# Patient Record
Sex: MALE | Race: BLACK OR AFRICAN AMERICAN | NOT HISPANIC OR LATINO | Employment: UNEMPLOYED | URBAN - METROPOLITAN AREA
[De-identification: names, ages, dates, MRNs, and addresses within clinical notes are randomized per-mention and may not be internally consistent; named-entity substitution may affect disease eponyms.]

---
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
## 2025-01-06 ENCOUNTER — OFFICE VISIT (OUTPATIENT)
Dept: PEDIATRICS | Facility: CLINIC | Age: 20
End: 2025-01-06
Payer: COMMERCIAL

## 2025-01-06 VITALS
DIASTOLIC BLOOD PRESSURE: 76 MMHG | BODY MASS INDEX: 32.03 KG/M2 | OXYGEN SATURATION: 99 % | WEIGHT: 228.8 LBS | HEIGHT: 71 IN | TEMPERATURE: 98.4 F | HEART RATE: 78 BPM | SYSTOLIC BLOOD PRESSURE: 115 MMHG

## 2025-01-06 DIAGNOSIS — Z11.59 NEED FOR HEPATITIS C SCREENING TEST: ICD-10-CM

## 2025-01-06 DIAGNOSIS — Z11.4 SCREENING FOR HIV (HUMAN IMMUNODEFICIENCY VIRUS): Primary | ICD-10-CM

## 2025-01-06 DIAGNOSIS — Z00.00 ROUTINE HISTORY AND PHYSICAL EXAMINATION OF ADULT: ICD-10-CM

## 2025-01-06 DIAGNOSIS — J45.20 INTERMITTENT ASTHMA WITHOUT COMPLICATION, UNSPECIFIED ASTHMA SEVERITY: ICD-10-CM

## 2025-01-06 PROCEDURE — 99173 VISUAL ACUITY SCREEN: CPT | Mod: 59 | Performed by: PEDIATRICS

## 2025-01-06 PROCEDURE — 90480 ADMN SARSCOV2 VAC 1/ONLY CMP: CPT | Performed by: PEDIATRICS

## 2025-01-06 PROCEDURE — 90471 IMMUNIZATION ADMIN: CPT | Performed by: PEDIATRICS

## 2025-01-06 PROCEDURE — 91320 SARSCV2 VAC 30MCG TRS-SUC IM: CPT | Performed by: PEDIATRICS

## 2025-01-06 PROCEDURE — 96127 BRIEF EMOTIONAL/BEHAV ASSMT: CPT | Mod: 4MD | Performed by: PEDIATRICS

## 2025-01-06 PROCEDURE — 99395 PREV VISIT EST AGE 18-39: CPT | Mod: 25 | Performed by: PEDIATRICS

## 2025-01-06 PROCEDURE — 92551 PURE TONE HEARING TEST AIR: CPT | Mod: 4MD | Performed by: PEDIATRICS

## 2025-01-06 PROCEDURE — 99213 OFFICE O/P EST LOW 20 MIN: CPT | Mod: 25 | Performed by: PEDIATRICS

## 2025-01-06 PROCEDURE — S0302 COMPLETED EPSDT: HCPCS | Mod: 4MD | Performed by: PEDIATRICS

## 2025-01-06 PROCEDURE — 90656 IIV3 VACC NO PRSV 0.5 ML IM: CPT | Performed by: PEDIATRICS

## 2025-01-06 RX ORDER — BUDESONIDE AND FORMOTEROL FUMARATE DIHYDRATE 80; 4.5 UG/1; UG/1
2 AEROSOL RESPIRATORY (INHALATION) 2 TIMES DAILY
Qty: 10.2 G | Refills: 0 | Status: SHIPPED | OUTPATIENT
Start: 2025-01-06

## 2025-01-06 SDOH — HEALTH STABILITY: PHYSICAL HEALTH: ON AVERAGE, HOW MANY DAYS PER WEEK DO YOU ENGAGE IN MODERATE TO STRENUOUS EXERCISE (LIKE A BRISK WALK)?: 3 DAYS

## 2025-01-06 SDOH — HEALTH STABILITY: PHYSICAL HEALTH: ON AVERAGE, HOW MANY MINUTES DO YOU ENGAGE IN EXERCISE AT THIS LEVEL?: 90 MIN

## 2025-01-06 ASSESSMENT — ASTHMA QUESTIONNAIRES
ACT_TOTALSCORE: 24
QUESTION_3 LAST FOUR WEEKS HOW OFTEN DID YOUR ASTHMA SYMPTOMS (WHEEZING, COUGHING, SHORTNESS OF BREATH, CHEST TIGHTNESS OR PAIN) WAKE YOU UP AT NIGHT OR EARLIER THAN USUAL IN THE MORNING: NOT AT ALL
QUESTION_1 LAST FOUR WEEKS HOW MUCH OF THE TIME DID YOUR ASTHMA KEEP YOU FROM GETTING AS MUCH DONE AT WORK, SCHOOL OR AT HOME: NONE OF THE TIME
QUESTION_5 LAST FOUR WEEKS HOW WOULD YOU RATE YOUR ASTHMA CONTROL: WELL CONTROLLED
QUESTION_4 LAST FOUR WEEKS HOW OFTEN HAVE YOU USED YOUR RESCUE INHALER OR NEBULIZER MEDICATION (SUCH AS ALBUTEROL): NOT AT ALL
ACT_TOTALSCORE: 24
QUESTION_2 LAST FOUR WEEKS HOW OFTEN HAVE YOU HAD SHORTNESS OF BREATH: NOT AT ALL

## 2025-01-06 NOTE — PATIENT INSTRUCTIONS
Patient Education    [facility]  18 THROUGH 21 YEAR VISITS  Here are some suggestions from [employer] experts that may be of value to your family.     HOW YOU ARE DOING  Enjoy spending time with your family.  Find activities you are really interested in, such as sports, theater, or volunteering.  Try to be responsible for your schoolwork or work obligations.  Always talk through problems and never use violence.  If you get angry with someone, try to walk away.  If you feel unsafe in your home or have been hurt by someone, let us know. Hotlines and community agencies can also provide confidential help.  Talk with us if you are worried about your living or food situation. Community agencies and programs such as SNAP can help.  Don t smoke, vape, or use drugs. Avoid people who do when you can. Talk with us if you are worried about alcohol or drug use in your family.    YOUR DAILY LIFE  Visit the dentist at least twice a year.  Brush your teeth at least twice a day and floss once a day.  Be a healthy eater.  Have vegetables, fruits, lean protein, and whole grains at meals and snacks.  Limit fatty, sugary, salty foods that are low in nutrients, such as candy, chips, and ice cream.  Eat when you re hungry. Stop when you feel satisfied.  Eat breakfast.  Drink plenty of water.  Make sure to get enough calcium every day.  Have 3 or more servings of low-fat (1%) or fat-free milk and other low-fat dairy products, such as yogurt and cheese.  Women: Make sure to eat foods rich in folate, such as fortified grains and dark- green leafy vegetables.  Aim for at least 1 hour of physical activity every day.  Wear safety equipment when you play sports.  Get enough sleep.  Talk with us about managing your health care and insurance as an adult.    YOUR FEELINGS  Most people have ups and downs. If you are feeling sad, depressed, nervous, irritable, hopeless, or angry, let us know or reach out to another health  care professional.  Figure out healthy ways to deal with stress.  Try your best to solve problems and make decisions on your own.  Sexuality is an important part of your life. If you have any questions or concerns, we are here for you.    HEALTHY BEHAVIOR CHOICES  Avoid using drugs, alcohol, tobacco, steroids, and diet pills. Support friends who choose not to use.  If you use drugs or alcohol, let us know or talk with another trusted adult about it. We can help you with quitting or cutting down on your use.  Make healthy decisions about your sexual behavior.  If you are sexually active, always practice safe sex. Always use birth control along with a condom to prevent pregnancy and sexually transmitted infections.  All sexual activity should be something you want. No one should ever force or try to convince you.  Protect your hearing at work, home, and concerts. Keep your earbud volume down.    STAYING SAFE  Always be a safe and cautious .  Insist that everyone use a lap and shoulder seat belt.  Limit the number of friends in the car and avoid driving at night.  Avoid distractions. Never text or talk on the phone while you drive.  Do not ride in a vehicle with someone who has been using drugs or alcohol.  If you feel unsafe driving or riding with someone, call someone you trust to drive you.  Wear helmets and protective gear while playing sports. Wear a helmet when riding a bike, a motorcycle, or an ATV or when skiing or skateboarding.  Always use sunscreen and a hat when you re outside.  Fighting and carrying weapons can be dangerous. Talk with your parents, teachers, or doctor about how to avoid these situations.        Consistent with Bright Futures: Guidelines for Health Supervision of Infants, Children, and Adolescents, 4th Edition  For more information, go to https://brightfutures.aap.org.

## 2025-01-06 NOTE — PROGRESS NOTES
Preventive Care Visit  [facility]  Piedad Yo MD, Pediatrics  Jan 6, 2025    Assessment & Plan   19 year old, here for preventive care.    Screening for HIV (human immunodeficiency virus)     - HIV Antigen Antibody Combo; Future  - TSH with free T4 reflex; Future  - Comprehensive metabolic panel; Future    Need for hepatitis C screening test     - Hepatitis C Screen Reflex to HCV RNA Quant and Genotype; Future  - Vitamin D Deficiency; Future  - Hemoglobin; Future  - TSH with free T4 reflex; Future  - Hemoglobin A1c; Future  - Comprehensive metabolic panel; Future    Encounter for routine child health examination w/o abnormal findings     - BEHAVIORAL/EMOTIONAL ASSESSMENT (37838)  - SCREENING TEST, PURE TONE, AIR ONLY  - SCREENING, VISUAL ACUITY, QUANTITATIVE, BILAT  - Vitamin D Deficiency; Future  - Lipid Profile; Future  - Hemoglobin; Future  - TSH with free T4 reflex; Future  - Hemoglobin A1c; Future  - Comprehensive metabolic panel; Future  - Adult Nutrition  Referral; Future    Body mass index (BMI) pediatric, 95th percentile for age to less than 120% of the 95th percentile for age   [unfilled] lower fat diet with portion control. Rec routine exercise activies. Rec healthy snack thru day.     Intermittent asthma without complication, unspecified asthma severity   In good control  - budesonide-formoterol (SYMBICORT) 80-4.5 MCG/ACT Inhaler; Inhale 2 puffs into the lungs 2 times daily.  Patient has been advised of split billing requirements and indicates understanding: Yes  Growth      Height: Normal , Weight: Obesity (BMI 95-99%)    Immunizations   Appropriate vaccinations were ordered.  MenB Vaccine not indicated.      Anticipatory Guidance    Reviewed age appropriate anticipatory guidance.   Reviewed Anticipatory Guidance in patient instructions         Referrals/Ongoing Specialty Care  Referrals made, see above  Verbal Dental Referral: Verbal dental referral was  given        Subjective   Ismail is presenting for the following:  Well Child               1/6/2025   Social   Lives with Family   Recent potential stressors None   History of trauma No   Family Hx of mental health challenges No   Lack of transportation has limited access to appts/meds No   Do you have housing? (Housing is defined as stable permanent housing and does not include staying ouside in a car, in a tent, in an abandoned building, in an overnight shelter, or couch-surfing.) Yes   Are you worried about losing your housing? No         1/6/2025     3:53 PM   Health Risks/Safety   Do you always wear a seat belt? Yes   Helmet use? Yes         1/6/2025     3:53 PM   TB Screening   Were you born outside of the [place]? No         1/6/2025     3:53 PM   TB Screening: Consider immunosuppression as a risk factor for TB   Recent TB infection or positive TB test in family/close contacts No   Recent travel outside [place] (you/family/close contacts) No   Recent residence in high-risk group setting (correctional facility/health care facility/homeless shelter/refugee camp) No          1/6/2025     3:53 PM   Dyslipidemia   FH: premature cardiovascular disease No, these conditions are not present in the patient's biologic parents or grandparents   FH: hyperlipidemia Unknown   Personal risk factors for heart disease NO diabetes, high blood pressure, obesity, smokes cigarettes, kidney problems, heart or kidney transplant, history of Kawasaki disease with an aneurysm, lupus, rheumatoid arthritis, or HIV     Recent Labs   Lab Test 11/23/22  0920 08/19/19  1108   CHOL 128 131   HDL 34* 42*   LDL 85 81   TRIG 46 43           1/6/2025     3:53 PM   Diet   What type of water? (!) BOTTLED         1/6/2025   Diet   Do you have questions about your eating?  No   Do you have questions about your weight?  No   What do you regularly drink? Water    Cow's Milk    (!) JUICE    (!) SPORTS DRINKS    (!) COFFEE OR TEA   What type of water?  "(!) BOTTLED   Do you think you eat healthy foods? Yes   At least 3 servings of food or beverages that have calcium each day? Yes   How would you describe your diet?  (!) HIGH PROTEIN   In past 12 months, concerned food might run out No   In past 12 months, food has run out/couldn't afford more No       Multiple values from one day are sorted in reverse-chronological order         1/6/2025   Activity   Days per week of moderate/strenuous exercise 3 days   On average, how many minutes do you engage in exercise at this level? 90 min   What do you do for exercise? Sports   What activities are you involved with? None         1/6/2025     3:53 PM   Media Use   Hours per day of screen time (for entertainment) 3         1/6/2025     3:53 PM   Sleep   Do you have any trouble with sleep? No         1/6/2025     3:53 PM   School   Are you in school? Yes   What school do you attend?  [employer]   What do you do for work? [employer]         1/6/2025     3:53 PM   Vision/Hearing   Vision or hearing concerns No concerns         Teen Screen    Teen Screen completed, reviewed and scanned document within chart.         Objective     Exam  /76 (Cuff Size: Adult Large)   Pulse 78   Temp 98.4  F (36.9  C) (Tympanic)   Ht 5' 10.5\" (1.791 m)   Wt 228 lb 12.8 oz (103.8 kg)   SpO2 99%   BMI 32.37 kg/m    63 %ile (Z= 0.33) based on CDC (Boys, 2-20 Years) Stature-for-age data based on Stature recorded on 1/6/2025.  98 %ile (Z= 2.06) based on CDC (Boys, 2-20 Years) weight-for-age data using data from 1/6/2025.  96 %ile (Z= 1.79) based on CDC (Boys, 2-20 Years) BMI-for-age based on BMI available on 1/6/2025.  Blood pressure %jorge are not available for patients who are 18 years or older.    Physical Exam  GENERAL: Active, alert, in no acute distress.  SKIN: Clear. No significant rash, abnormal pigmentation or lesions  HEAD: Normocephalic  EYES: Pupils equal, round, reactive, Extraocular muscles intact. Normal conjunctivae.  EARS: Normal " canals. Tympanic membranes are normal; gray and translucent.  NOSE: Normal without discharge.  MOUTH/THROAT: Clear. No oral lesions. Teeth without obvious abnormalities.  NECK: Supple, no masses.  No thyromegaly.  LYMPH NODES: No adenopathy  LUNGS: Clear. No rales, rhonchi, wheezing or retractions  HEART: Regular rhythm. Normal S1/S2. No murmurs. Normal pulses.  ABDOMEN: Soft, non-tender, not distended, no masses or hepatosplenomegaly. Bowel sounds normal.   NEUROLOGIC: No focal findings. Cranial nerves grossly intact: DTR's normal. Normal gait, strength and tone  BACK: Spine is straight, no scoliosis.  EXTREMITIES: Full range of motion, no deformities  : Normal male external genitalia. Hector stage 4,  both testes descended, no hernia.       No Marfan stigmata: kyphoscoliosis, high-arched palate, pectus excavatuM, arachnodactyly, arm span > height, hyperlaxity, myopia, MVP, aortic insufficieny)  Eyes: normal fundoscopic and pupils  Cardiovascular: normal PMI, simultaneous femoral/radial pulses, no murmurs (standing, supine, Valsalva)  Skin: no HSV, MRSA, tinea corporis  Musculoskeletal    Neck: normal    Back: normal    Shoulder/arm: normal    Elbow/forearm: normal    Wrist/hand/fingers: normal    Hip/thigh: normal    Knee: normal    Leg/ankle: normal    Foot/toes: normal    Functional (Single Leg Hop or Squat): normal    20  additional minutes spent on patient's problem evaluation and management  including time  devoted to previous noted and medicalhx associated with problem, coordination of care for diagnosis and plan , and documentation as  noted above   Discussion included  future prevention and treatment  options as well as side effects and dosing of medications related to    Screening for HIV (human immunodeficiency virus)  Need for hepatitis C screening test  Body mass index (BMI) pediatric, 95th percentile for age to less than 120% of the 95th percentile for age  Intermittent asthma without complication,  unspecified asthma severity         Signed Electronically by: Piedad Yo MD